# Patient Record
Sex: MALE | Race: WHITE | Employment: FULL TIME | URBAN - METROPOLITAN AREA
[De-identification: names, ages, dates, MRNs, and addresses within clinical notes are randomized per-mention and may not be internally consistent; named-entity substitution may affect disease eponyms.]

---

## 2022-07-14 ENCOUNTER — ANESTHESIA EVENT (OUTPATIENT)
Dept: ENDOSCOPY | Age: 62
End: 2022-07-14
Payer: COMMERCIAL

## 2022-07-15 ENCOUNTER — HOSPITAL ENCOUNTER (OUTPATIENT)
Age: 62
Setting detail: OUTPATIENT SURGERY
Discharge: HOME | End: 2022-07-15
Attending: INTERNAL MEDICINE | Admitting: INTERNAL MEDICINE
Payer: COMMERCIAL

## 2022-07-15 ENCOUNTER — ANESTHESIA (OUTPATIENT)
Dept: ENDOSCOPY | Age: 62
End: 2022-07-15
Payer: COMMERCIAL

## 2022-07-15 VITALS
BODY MASS INDEX: 36.03 KG/M2 | RESPIRATION RATE: 18 BRPM | WEIGHT: 266 LBS | DIASTOLIC BLOOD PRESSURE: 94 MMHG | HEART RATE: 63 BPM | HEIGHT: 72 IN | TEMPERATURE: 97.2 F | SYSTOLIC BLOOD PRESSURE: 157 MMHG | OXYGEN SATURATION: 96 %

## 2022-07-15 DIAGNOSIS — Z12.11 SCREEN FOR COLON CANCER: ICD-10-CM

## 2022-07-15 DIAGNOSIS — K21.9 GERD WITHOUT ESOPHAGITIS: ICD-10-CM

## 2022-07-15 PROCEDURE — 6360000002 HC RX W HCPCS: Performed by: NURSE ANESTHETIST, CERTIFIED REGISTERED

## 2022-07-15 PROCEDURE — 3700000000 HC ANESTHESIA ATTENDED CARE: Performed by: INTERNAL MEDICINE

## 2022-07-15 PROCEDURE — 7100000010 HC PHASE II RECOVERY - FIRST 15 MIN: Performed by: INTERNAL MEDICINE

## 2022-07-15 PROCEDURE — 7100000011 HC PHASE II RECOVERY - ADDTL 15 MIN: Performed by: INTERNAL MEDICINE

## 2022-07-15 PROCEDURE — 2580000003 HC RX 258: Performed by: ANESTHESIOLOGY

## 2022-07-15 PROCEDURE — 2500000003 HC RX 250 WO HCPCS: Performed by: NURSE ANESTHETIST, CERTIFIED REGISTERED

## 2022-07-15 PROCEDURE — 3609010600 HC COLONOSCOPY POLYPECTOMY SNARE/COLD BIOPSY: Performed by: INTERNAL MEDICINE

## 2022-07-15 PROCEDURE — 3609015800 HC ESOPHAGOSCOPY BIOPSY: Performed by: INTERNAL MEDICINE

## 2022-07-15 PROCEDURE — 2709999900 HC NON-CHARGEABLE SUPPLY: Performed by: INTERNAL MEDICINE

## 2022-07-15 PROCEDURE — 3700000001 HC ADD 15 MINUTES (ANESTHESIA): Performed by: INTERNAL MEDICINE

## 2022-07-15 PROCEDURE — 88305 TISSUE EXAM BY PATHOLOGIST: CPT

## 2022-07-15 RX ORDER — SODIUM CHLORIDE 9 MG/ML
INJECTION, SOLUTION INTRAVENOUS PRN
Status: DISCONTINUED | OUTPATIENT
Start: 2022-07-15 | End: 2022-07-15 | Stop reason: HOSPADM

## 2022-07-15 RX ORDER — RISANKIZUMAB-RZAA 150 MG/ML
INJECTION SUBCUTANEOUS
COMMUNITY

## 2022-07-15 RX ORDER — SODIUM CHLORIDE, SODIUM LACTATE, POTASSIUM CHLORIDE, CALCIUM CHLORIDE 600; 310; 30; 20 MG/100ML; MG/100ML; MG/100ML; MG/100ML
INJECTION, SOLUTION INTRAVENOUS CONTINUOUS
Status: DISCONTINUED | OUTPATIENT
Start: 2022-07-15 | End: 2022-07-15 | Stop reason: HOSPADM

## 2022-07-15 RX ORDER — SODIUM CHLORIDE 0.9 % (FLUSH) 0.9 %
5-40 SYRINGE (ML) INJECTION PRN
Status: DISCONTINUED | OUTPATIENT
Start: 2022-07-15 | End: 2022-07-15 | Stop reason: HOSPADM

## 2022-07-15 RX ORDER — OMEPRAZOLE 40 MG/1
40 CAPSULE, DELAYED RELEASE ORAL DAILY
COMMUNITY

## 2022-07-15 RX ORDER — LIDOCAINE HYDROCHLORIDE 20 MG/ML
INJECTION, SOLUTION INFILTRATION; PERINEURAL PRN
Status: DISCONTINUED | OUTPATIENT
Start: 2022-07-15 | End: 2022-07-15 | Stop reason: SDUPTHER

## 2022-07-15 RX ORDER — SODIUM CHLORIDE 0.9 % (FLUSH) 0.9 %
5-40 SYRINGE (ML) INJECTION EVERY 12 HOURS SCHEDULED
Status: DISCONTINUED | OUTPATIENT
Start: 2022-07-15 | End: 2022-07-15 | Stop reason: HOSPADM

## 2022-07-15 RX ORDER — PROPOFOL 10 MG/ML
INJECTION, EMULSION INTRAVENOUS PRN
Status: DISCONTINUED | OUTPATIENT
Start: 2022-07-15 | End: 2022-07-15 | Stop reason: SDUPTHER

## 2022-07-15 RX ADMIN — PROPOFOL 50 MG: 10 INJECTION, EMULSION INTRAVENOUS at 12:13

## 2022-07-15 RX ADMIN — PROPOFOL 50 MG: 10 INJECTION, EMULSION INTRAVENOUS at 12:23

## 2022-07-15 RX ADMIN — PROPOFOL 50 MG: 10 INJECTION, EMULSION INTRAVENOUS at 12:19

## 2022-07-15 RX ADMIN — PROPOFOL 50 MG: 10 INJECTION, EMULSION INTRAVENOUS at 12:29

## 2022-07-15 RX ADMIN — SODIUM CHLORIDE, SODIUM LACTATE, POTASSIUM CHLORIDE, AND CALCIUM CHLORIDE: .6; .31; .03; .02 INJECTION, SOLUTION INTRAVENOUS at 11:12

## 2022-07-15 RX ADMIN — PROPOFOL 50 MG: 10 INJECTION, EMULSION INTRAVENOUS at 12:01

## 2022-07-15 RX ADMIN — PROPOFOL 50 MG: 10 INJECTION, EMULSION INTRAVENOUS at 12:08

## 2022-07-15 RX ADMIN — PROPOFOL 50 MG: 10 INJECTION, EMULSION INTRAVENOUS at 12:05

## 2022-07-15 RX ADMIN — LIDOCAINE HYDROCHLORIDE 100 MG: 20 INJECTION, SOLUTION INFILTRATION; PERINEURAL at 12:01

## 2022-07-15 RX ADMIN — PROPOFOL 50 MG: 10 INJECTION, EMULSION INTRAVENOUS at 12:03

## 2022-07-15 ASSESSMENT — PAIN - FUNCTIONAL ASSESSMENT: PAIN_FUNCTIONAL_ASSESSMENT: NONE - DENIES PAIN

## 2022-07-15 ASSESSMENT — LIFESTYLE VARIABLES: SMOKING_STATUS: 1

## 2022-07-15 NOTE — H&P
Gastroenterology Outpatient History and Physical     Patient: Ze Garcia MRN: [id number] Sex: male   YOB: 1960 Age: 65 y.o. Location: [address] [place] [address]    Date:7/15/2022  Primary Care Physician: Faiza Strauss         Patient: Ze Garcia    Physician: Ira Barahona MD    History of Present Illness: EGD for chronic GERD and Borden's esophagus screening. Screening colonoscopy. 58-year-old man history of occasional rectal bleeding likely on the basis of a hemorrhoid or fissure. No family history of colon cancer. His last colonoscopy was 12 years ago. He recently started Metamucil fiber thins and feels he has had improvement in his constipation. Review of Systems:  Weight Loss: No  Dysphagia: No  Dyspepsia: No  History:  Past Medical History:   Diagnosis Date    GERD (gastroesophageal reflux disease)     Hydrocele 1990    Hypertension     Psoriasis       Past Surgical History:   Procedure Laterality Date    ANTERIOR CRUCIATE LIGAMENT REPAIR  1996    APPENDECTOMY  1997    HERNIA REPAIR      NASAL SEPTUM SURGERY  1995    TOTAL HIP ARTHROPLASTY Right       Social History     Socioeconomic History    Marital status:      Spouse name: None    Number of children: None    Years of education: None    Highest education level: None   Tobacco Use    Smoking status: Never    Smokeless tobacco: Never   Vaping Use    Vaping Use: Never used   Substance and Sexual Activity    Alcohol use: Yes     Alcohol/week: 10.0 standard drinks     Types: 10 Cans of beer per week     Comment: occasionally    Drug use: Never      History reviewed. No pertinent family history. Allergies: No Known Allergies  Medications:   Prior to Admission medications    Medication Sig Start Date End Date Taking?  Authorizing Provider   Risankizumab-rzaa [facility]) 150 MG/ML SOSY Inject into the skin   Yes Historical Provider, MD   omeprazole (PRILOSEC) 40 MG delayed release capsule Take 40 mg by mouth in the morning. Yes Historical Provider, MD   amLODIPine (NORVASC) 5 MG tablet  9/12/16   Historical Provider, MD   aspirin 81 MG tablet Take by mouth    Historical Provider, MD       Vital Signs: BP (!) 150/96   Pulse 76   Temp 97.4 °F (36.3 °C) (Temporal)   Resp 16   Ht 6' (1.829 m)   Wt 266 lb (120.7 kg)   SpO2 95%   BMI 36.08 kg/m²   Physical Exam:   Heart: regular rrr  Lungs: non-labored breathing  Mental status:  Alert and oriented    ASA score:  ASA 3 - Patient with moderate systemic disease with functional limitations{  Mallimpati score:  2     Planned Procedure: EGD/colon    Planned Sedation: Conscious sedation / Monitored anesthesia.     Signed By: Laura Schmitz MD   July 15, 2022

## 2022-07-15 NOTE — PROGRESS NOTES
Patient to room #24 SDS bed post op colonoscopy. VS stable. Brother at bedside updated. Dr. Salter  at bedside- spoke to patient and family about findings and follow up care. No complaints of pain or nausea.

## 2022-07-15 NOTE — PROGRESS NOTES
Discharge instructions given to patient and brother Yordy Porter. IV dc/d, dressed and discharged home via wc to car- brother driving him home. Patient verbalizes understanding of follow up care.

## 2022-07-15 NOTE — PROCEDURES
EGD/COLONOSCOPY     Patient: Asher Fernandes MRN: [id number]   YOB: 1960 Age: 65 y.o. Sex: male   Unit: [facility] ENDOSCOPY Room/Bed: Endo Pool/NONE Location: [address] [place] [address]    Admitting Physician: Jovita Munoz     Primary Care Physician: Marti Rodriguez      DATE OF PROCEDURE: 7/15/2022  PROCEDURE: EGD/Colonoscopy    PREOPERATIVE DIAGNOSIS: Screen for colon cancer [Z12.11]  GERD without esophagitis [K21.9]  HPI: EGD for chronic GERD and Borden's esophagus screening. Screening colonoscopy. 18-year-old man history of occasional rectal bleeding likely on the basis of a hemorrhoid or fissure. No family history of colon cancer. His last colonoscopy was 12 years ago. He recently started Metamucil fiber thins and feels he has had improvement in his constipation. ENDOSCOPIST: Lisa Hooker MD    POSTOPERATIVE DIAGNOSIS:    -2 small incidental esophageal diverticula in the mid to distal portion.  -Equivocal short segment Borden's esophagus (C0 M1) biopsied.  -Unremarkable stomach and duodenum to the second portion.  -2 small transverse colon polyps removed and retrieved with a cold snare.  -Mild left-sided diverticulosis    PLAN:   -Continue omeprazole 40 mg daily.  -We will follow-up with biopsy results and update the patient in about 2 weeks. Anticipate repeat colonoscopy in 7 to 10 years depending on polyp results. -If he develops dysphagia, we will repeat EGD and proceed with esophagram  -Continue fiber supplement  -Follow-up in the office in 1 year or sooner as needed    INFORMED CONSENT:  Informed consent for colonoscopy was obtained. The benefits and risks including adverse medicine reaction and perforation have been explained. The patient's questions were answered and the patient agreed to proceed.     ASA: ASA 3 - Patient with moderate systemic disease with functional limitations     SEDATION: MAC    The patient's vital signs, cardiac status, pulmonary status, abdominal status and mental status were stable for the procedure. The patient's vital signs and respiratory function as monitored by oxygen saturation remained stable. COLON PREPARATION:  The patient was given a split colon preparation and the preparation was adequate. EGD/Colonoscopy Procedure Details:      Procedure Details: The Olympus videoendoscope was inserted into the mouth and carefully passed into the esophagus, through the stomach and to the distal duodenum. Retroflexion in the cardia and fundus was performed. Next, a digital rectal exam was performed. The Olympus videocolonoscope  was inserted in the rectum and carefully advanced to the cecum as identified by IC valve, crow's foot appearance and appendix. Retroflexion in the right colon was performed more than the endoscope was straightened and advanced back to the cecum. The colonoscope was slowly withdrawn with careful inspection around and between folds. Retroflexion in the rectum was performed. Cecum Intubated: Yes    Findings: The esophagus is notable for 2 small esophageal diverticula, 32 to 34 cm on the right side, and another small diverticulum from 37 to 39 cm on the right side. At the GE junction, there was a short tongue of salmon-colored mucosa, about a centimeter long emanating from the GE junction which was at about 40 cm from the incisors. The diaphragm was at the same level. Biopsies were obtained to rule out Borden's. In the stomach, the cardia, fundus, gastric body and antrum are unremarkable. Retroflexed views in the cardia and fundus were unremarkable. The duodenum was unremarkable in the first and second portions. The cecum including the appendix and ileocecal valve were grossly normal.  The ascending colon is unremarkable. In the transverse colon, there were 2 small 2 to 3 mm polyps removed and retrieved with a forcep. The descending and sigmoid colon contained mild diverticulosis. The rectum was unremarkable. The ascending, transverse, descending, sigmoid and rectum were otherwise unremarkable.     Estimated Blood Loss:  Minimal  Complications: None    Signed By: Karlos Cervantes MD

## 2022-07-15 NOTE — DISCHARGE INSTRUCTIONS
ENDOSCOPY DISCHARGE INSTRUCTIONS:    Call the physician that did your procedure for any questions or concern:    [facility]: [phone]  DR. CALISTA MCCANN      ACTIVITY:    There are potential side effects to the medications used for sedation and anesthesia during your procedure. These include:  Dizziness or light-headedness, confusion or memory loss, delayed reaction times, loss of coordination, nausea and vomiting. Because of your increased risk for injury, we ask that you observe the following precautions: For the next 24 hours,  DO NOT operate an automobile, bicycle, motorcycle, , power tools or large equipment of any kind. Do not drink alcohol, sign any legal documents or make any legal decisions for 24 hours. Do not bend your head over lower than your heart. DO sit on the side of bed/couch awhile before getting up. Plan on bedrest or quiet relaxation today. You may resume normal activities in 24 hours. DIET:    Your first meal today should be light, avoiding spicy and fatty foods. If you tolerate this first meal, then you may advance to your regular diet unless otherwise advised by your physician. NORMAL SYMPTOMS:  -Mild sore throat if youve had an EGD   -Gaseous discomfort    NOTIFY YOUR PHYSICIAN IF THESE SYMPTOMS OCCUR:  1. Fever (greater than 100)  5. Increased abdominal bloating  2. Severe pain    6. Excessive bleeding  3. Nausea and vomiting  7. Chest pain                                                                    4. Chills    8. Shortness of breath    ADDITIONAL INSTRUCTIONS:    Biopsy results: Call [address],[facility] for biopsy results in 1 week    Educational Information:          Please review these discharge instructions this evening or tomorrow for  information you may have forgotten. We want to thank you for choosing the [facility] as your health care provider. We always strive to provide you with excellent care while you are here.  You may receive a survey in the mail regarding your care. We would appreciate you taking a few minutes of your time to complete this survey. Findings: The esophagus is notable for 2 small esophageal diverticula, 32 to 34 cm on the right side, and another small diverticulum from 37 to 39 cm on the right side. At the GE junction, there was a short tongue of salmon-colored mucosa, about a centimeter long emanating from the GE junction which was at about 40 cm from the incisors. The diaphragm was at the same level. Biopsies were obtained to rule out Borden's. In the stomach, the cardia, fundus, gastric body and antrum are unremarkable. Retroflexed views in the cardia and fundus were unremarkable. The duodenum was unremarkable in the first and second portions. The cecum including the appendix and ileocecal valve were grossly normal.  The ascending colon is unremarkable. In the transverse colon, there were 2 small 2 to 3 mm polyps removed and retrieved with a forcep. The descending and sigmoid colon contained mild diverticulosis. The rectum was unremarkable. The ascending, transverse, descending, sigmoid and rectum were otherwise unremarkable.      Estimated Blood Loss:  Minimal  Complications: None     Signed By: Evelyn Kincaid MD

## 2022-07-15 NOTE — ANESTHESIA POSTPROCEDURE EVALUATION
Department of Anesthesiology  Postprocedure Note    Patient: Zeenat Parekh  MRN: [id number]  YOB: 1960  Date of evaluation: 7/15/2022      Procedure Summary     Date: 07/15/22 Room / Location: [facility]    Anesthesia Start: 1157 Anesthesia Stop: 1239    Procedures:       ESOPHAGOSCOPY BIOPSY      COLONOSCOPY POLYPECTOMY SNARE/COLD BIOPSY Diagnosis:       Screen for colon cancer      GERD without esophagitis      (Screen for colon cancer [Z12.11])      (GERD without esophagitis [K21.9])    Surgeons: Kesha Carreno MD Responsible Provider: Baron Yan MD    Anesthesia Type: MAC ASA Status: 2          Anesthesia Type: No value filed.     Sophia Phase I: Sophia Score: 10    Sophia Phase II: Sophia Score: 10      Anesthesia Post Evaluation    Patient location during evaluation: bedside  Level of consciousness: awake and alert  Airway patency: patent  Nausea & Vomiting: no vomiting  Complications: no  Cardiovascular status: blood pressure returned to baseline  Respiratory status: acceptable  Hydration status: euvolemic  Multimodal analgesia pain management approach

## 2022-07-15 NOTE — ANESTHESIA PRE PROCEDURE
Department of Anesthesiology  Preprocedure Note       Name:  Moises Goodrich   Age:  65 y.o.  :  1960                                          MRN:  [id number]         Date:  7/15/2022      Surgeon: Ginette Cortez):  Jessie Gutierrez MD    Procedure: Procedure(s):  ESOPHAGOGASTRODUODENOSCOPY  COLONOSCOPY    Medications prior to admission:   Prior to Admission medications    Medication Sig Start Date End Date Taking? Authorizing Provider   amLODIPine (NORVASC) 5 MG tablet  16   Historical Provider, MD   aspirin 81 MG tablet Take by mouth    Historical Provider, MD   esomeprazole (NEXIUM) 40 MG delayed release capsule Take 40 mg by mouth    Historical Provider, MD       Current medications:    No current facility-administered medications for this encounter. Allergies:  No Known Allergies    Problem List:    Patient Active Problem List   Diagnosis Code    Pulled muscle T14. 8XXA       Past Medical History:        Diagnosis Date    Hydrocele     Hypertension        Past Surgical History:        Procedure Laterality Date    ANTERIOR CRUCIATE LIGAMENT REPAIR      APPENDECTOMY      NASAL SEPTUM SURGERY         Social History:    Social History     Tobacco Use    Smoking status: Never    Smokeless tobacco: Not on file   Substance Use Topics    Alcohol use: Yes     Alcohol/week: 10.0 standard drinks     Types: 10 Cans of beer per week                                Counseling given: Not Answered      Vital Signs (Current): There were no vitals filed for this visit.                                            BP Readings from Last 3 Encounters:   No data found for BP       NPO Status:                                                                                 BMI:   Wt Readings from Last 3 Encounters:   16 260 lb (117.9 kg)     There is no height or weight on file to calculate BMI.    CBC: No results found for: WBC, RBC, HGB, HCT, MCV, RDW, PLT    CMP: No results found for: NA, K, CL,

## 2025-08-07 LAB
APTT: 26.9 SEC (ref 24.5–35.2)
BASOPHILS ABSOLUTE: 0 K/UL (ref 0–0.3)
BASOPHILS RELATIVE PERCENT: 0.2 % (ref 0–2)
BUN / CREAT RATIO: 14 (ref 7–25)
BUN BLDV-MCNC: 11 MG/DL (ref 3–29)
CALCIUM SERPL-MCNC: 9.6 MG/DL (ref 8.5–10.5)
CHLORIDE BLD-SCNC: 101 MEQ/L (ref 96–110)
CO2: 24 MEQ/L (ref 19–32)
CREAT SERPL-MCNC: 0.8 MG/DL (ref 0.5–1.2)
DIFFERENTIAL COUNT: NORMAL
EOSINOPHILS ABSOLUTE: 0.1 K/UL (ref 0–0.5)
EOSINOPHILS RELATIVE PERCENT: 1.4 % (ref 0–5)
ESTIMATED GLOMERULAR FILTRATION RATE CREATININE EQUATION: 99 MLS/MIN/1.73M2
FASTING STATUS: ABNORMAL
GLUCOSE BLD-MCNC: 121 MG/DL (ref 70–99)
HBA1C MFR BLD: 6.1 %
HCT VFR BLD CALC: 49.4 % (ref 37.5–51)
HEMOGLOBIN: 17.3 G/DL (ref 13–17.7)
IMMATURE GRANS (ABS): 0 K/UL (ref 0–0.1)
IMMATURE GRANULOCYTES %: 0.4 %
INR BLD: 1.1 (ref 0.9–1.1)
LYMPHOCYTES ABSOLUTE: 1.4 K/UL (ref 0.9–4.1)
LYMPHOCYTES RELATIVE PERCENT: 27 % (ref 14–51)
MCH RBC QN AUTO: 30.7 PG (ref 26–34)
MCHC RBC AUTO-ENTMCNC: 35 G/DL (ref 30.7–35.5)
MCV RBC AUTO: 87.6 FL (ref 80–100)
MONOCYTES ABSOLUTE: 0.5 K/UL (ref 0.2–1)
MONOCYTES RELATIVE PERCENT: 9.6 % (ref 4–12)
NEUTROPHILS ABSOLUTE: 3.2 K/UL (ref 1.8–7.5)
NEUTROPHILS RELATIVE PERCENT: 61.4 % (ref 42–80)
PDW BLD-RTO: 12.9 %
PLATELET # BLD: 200 K/UL (ref 140–400)
PMV BLD AUTO: 10 FL (ref 7.2–11.7)
POTASSIUM SERPL-SCNC: 4.4 MEQ/L (ref 3.4–5.3)
PROTHROMBIN TIME: 13.9 SEC (ref 11.7–13.9)
RBC # BLD: 5.64 M/UL (ref 4.14–5.8)
RETICULOCYTE ABSOLUTE COUNT: 0 /100 WBC
SODIUM BLD-SCNC: 137 MEQ/L (ref 135–148)
WBC # BLD: 5.1 K/UL (ref 3.5–10.9)

## (undated) DEVICE — CANNULA SAMP CO2 AD GRN 7FT CO2 AND 7FT O2 TBNG UNIV CONN

## (undated) DEVICE — FORCEPS BX L240CM JAW DIA2.8MM L CAP W/ NDL MIC MESH TOOTH